# Patient Record
Sex: FEMALE | Race: OTHER | HISPANIC OR LATINO | Employment: UNEMPLOYED | URBAN - METROPOLITAN AREA
[De-identification: names, ages, dates, MRNs, and addresses within clinical notes are randomized per-mention and may not be internally consistent; named-entity substitution may affect disease eponyms.]

---

## 2019-01-16 ENCOUNTER — HOSPITAL ENCOUNTER (EMERGENCY)
Facility: HOSPITAL | Age: 44
Discharge: HOME | End: 2019-01-16
Attending: EMERGENCY MEDICINE | Admitting: EMERGENCY MEDICINE
Payer: COMMERCIAL

## 2019-01-16 VITALS
OXYGEN SATURATION: 100 % | RESPIRATION RATE: 18 BRPM | TEMPERATURE: 100.1 F | WEIGHT: 135 LBS | DIASTOLIC BLOOD PRESSURE: 61 MMHG | HEART RATE: 115 BPM | SYSTOLIC BLOOD PRESSURE: 137 MMHG

## 2019-01-16 DIAGNOSIS — L50.9 URTICARIA: Primary | ICD-10-CM

## 2019-01-16 PROCEDURE — 99283 EMERGENCY DEPT VISIT LOW MDM: CPT

## 2019-01-16 RX ORDER — HYDROXYZINE HYDROCHLORIDE 25 MG/1
25 TABLET, FILM COATED ORAL EVERY 6 HOURS
Qty: 15 TABLET | Refills: 0 | Status: SHIPPED | OUTPATIENT
Start: 2019-01-16

## 2019-01-16 RX ORDER — PREDNISONE 10 MG/1
TABLET ORAL
Qty: 38 TABLET | Refills: 0 | Status: SHIPPED | OUTPATIENT
Start: 2019-01-16

## 2019-01-16 NOTE — DISCHARGE INSTRUCTIONS
Urticaria   LO QUE NECESITA SABER:   La urticaria también se conoce roxie ronchas  Las ronchas pueden cambiar el tamaño y la forma y aparecen en cualquier lugar de la piel  La urticaria puede ser leve o severa y durar de unos minutos a unos días  Es probable que las ronchas cely un signo de alma reacción alérgica grave conocida roxie anafilaxis que necesita tratamiento inmediato  La urticaria que dura más de 6 semanas puede ser un trastorno crónico que necesita de tratamiento de larga duración  INSTRUCCIONES SOBRE EL CIRO HOSPITALARIA:   Llame al 911 en cesario de presentar signos o síntomas de anafilaxia,  roxie dificultad para respirar, inflamación en frankel boca o garganta, o sibilancias  También es posible que tenga comezón, sarpullido o sienta que se va a desmayar  Regrese a la alycia de emergencias si:   · Frankel corazón está latiendo más rápido de lo normal     · Usted tiene calambres o dolor demarcus en el abdomen  Pregúntele a frankel Tamiko Pel vitaminas y minerales son adecuados para usted  · Usted tiene fiebre  · Frankel piel todavía tiene comezón 24 horas después de tomarse frankel medicamento  · Usted todavía tiene ronchas 7 días después  · Aniket articulaciones están adoloridas e inflamadas  · Usted tiene preguntas o inquietudes acerca de frankel condición o cuidado  Medicamentos:   · La epinefrina  se [place] para tratar reacciones alérgicas graves roxie la anafilaxia  · Los antihistamínicos  reducen los síntomas moderados roxie la comezón o un sarpullido  · Esteroides  reducen el enrojecimiento, el dolor y la inflamación  · [place] aniket medicamentos roxie se le haya indicado  Consulte con frankel médico si usted harry que frankel medicamento no le está ayudando o si presenta efectos secundarios  Infórmele si es alérgico a algún medicamento  Mantenga alma lista actualizada de los [place], las vitaminas y los productos herbales que shantel   Incluya los siguientes datos de los medicamentos: cantidad, frecuencia y [place] de administración  Traiga con usted la lista o los envases de la píldoras a aniket citas de seguimiento  Lleve la lista de los medicamentos con usted en cesario de alma emergencia  [place] que necesito seguir para los signos o síntomas de la anafilaxia:   · Inmediatamente  aplíquese 1 inyección de epinefrina abad hágalo solamente en el músculo del muslo que da hacia afuera  · Deje la inyección en el lugar  según las indicaciones  Es probable que frankel médico le recomiende que se la deje puesta por hasta 10 segundos antes de quitarla  [place] ayuda a asegurarse de que toda la epinefrina sea aplicada  · Llame al 911 y vaya al servicio de [place],  aunque la inyección haya edgar aniket síntomas  No maneje usted mismo  Lleve con usted la inyección de epinefrina que usó  Precauciones de seguridad a madeline si usted corre riesgo de anafilaxia:   · Tenga a mano 2 inyecciones de epinefrina en todo momento  Puede que usted necesite alma segunda inyección ya que la epinefrina solamente actúa por aproximadamente 20 minutos y los síntomas podrían regresar  Frankel médico puede mostrarle a usted y a aniket familiares cómo aplicar la inyección  Revise la fecha de vencimiento todos los meses y reemplace el medicamento de frankel vencimiento  · [place] un plan de acción  Frankel médico puede ayudarle a crear un plan escrito que explique la alergia y un plan de emergencia para tratar alma reacción  El plan explica cuándo aplicar alma segunda inyección de epinefrina si los síntomas vuelven o no mejoran después de la primera inyección  Asegúrese de que aniket familiares, personal de frankel trabajo y escuela tengan alma copia del plan de acción e instrucciones de emergencia  Muéstreles cómo aplicar la inyección de epinefrina  · Tenga cuidado cuando ady ejercicio  Si usted tuvo anafilaxis inducida por el ejercicio, no se ejercite inmediatamente después de comer  Pare de ejercitarse de inmediato si empieza a desarrollar cualquier signo o síntoma de anafilaxia  Puede que al principio se sienta cansado, caliente o tenga comezón en la piel  También podría desarrollar urticaria, inflamación y problemas graves de respiración si continúa ejercitándose  · Lleve alma identificación de [employer]  Use un brazalete o collar de alerta médica o lleve alma tarjeta que explique la alergia  Pregúntele a frankel médico dónde conseguir estos artículos  · Mantenga un diario de los desencadenantes y síntomas  Anote todo lo que usted come, shantel o aplique en frankel piel por 3 semanas  Incluya eventos estresantes y lo que usted estaba haciendo carlos antes de que empezara la urticaria  [place] frankel registro a las citas de seguimiento con frankel médico   Controle la urticaria:   · Enfríe frankel piel  Puede que esto le ayude a disminuir la comezón  Aplíquese alma compresa fría sobre la urticaria  Sumerja alma toalla en agua fría, escúrrala y póngasela sobre la urticaria  También puede empapar frankel piel en un baño de agua fría con therese  · No se rasque las ronchas  [place] puede irritar frankel piel y causarle más ronchas  · Use ropa holgada  La ropa ajustada podría irritar frankel piel y causarle más ronchas  · Controle el estrés  El estrés podría provocar la urticaria o incluso empeorarla  Aprenda nuevas maneras de relajarse roxie la respiración profunda  Acuda a aniket consultas de control con frankel médico según le indicaron  Anote aniket preguntas para que se acuerde de hacerlas bonnie aniket visitas  © 2017 [address] Information is for End User's use only and may not be sold, redistributed or otherwise used for commercial purposes  All illustrations and images included in CareNotes® are the copyrighted property of A D A M , Inc  or Raúl Makenna  Esta información es sólo para uso en educación  Frankel intención no es darle un consejo médico sobre enfermedades o tratamientos   Colsulte con frankel Kandace Antis farmacéutico antes de seguir cualquier régimen médico para saber si es seguro y efectivo para usted

## 2019-01-16 NOTE — ED PROVIDER NOTES
History  Chief Complaint   Patient presents with    Allergic Reaction     patietn c/o of rash and SOB that started today;      38-year-old female presents for evaluation a rash with also the complaint of shortness of breath which began today after she is not body wash  She complains of rash to the forearms and the lower legs  Patient is no longer complaining shortness of breath on my evaluation  She states that she took some Benadryl without relief of the itching            None       Past Medical History:   Diagnosis Date    Asthma        History reviewed  No pertinent surgical history  History reviewed  No pertinent family history  I have reviewed and agree with the history as documented  Social History   Substance Use Topics    Smoking status: Current Every Day Smoker    Smokeless tobacco: Never Used    Alcohol use Yes      Comment: socially        Review of Systems   Constitutional: Positive for chills  Negative for fever  HENT: Positive for sore throat  Respiratory: Negative for shortness of breath, wheezing and stridor  Skin: Positive for rash  All other systems reviewed and are negative  Physical Exam  Physical Exam   Constitutional: She is oriented to person, place, and time  She appears well-developed and well-nourished  No distress  HENT:   Head: Normocephalic and atraumatic  Right Ear: External ear normal    Left Ear: External ear normal    Eyes: Pupils are equal, round, and reactive to light  Conjunctivae and EOM are normal  No scleral icterus  Neck: Normal range of motion  Cardiovascular: Normal rate, regular rhythm and normal heart sounds  Pulmonary/Chest: Effort normal and breath sounds normal  No respiratory distress  She has no wheezes  Abdominal: Soft  Bowel sounds are normal  There is no tenderness  There is no rebound and no guarding  Musculoskeletal: Normal range of motion  She exhibits no edema     Neurological: She is alert and oriented to person, place, and time  Skin: Skin is warm and dry  Rash noted  Rash is urticarial ( forearms, legs, abdomen)  Psychiatric: She has a normal mood and affect  Nursing note and vitals reviewed  Vital Signs  ED Triage Vitals [01/16/19 1549]   Temperature Pulse Respirations Blood Pressure SpO2   100 1 °F (37 8 °C) (!) 115 18 137/61 100 %      Temp Source Heart Rate Source Patient Position - Orthostatic VS BP Location FiO2 (%)   Oral Monitor Sitting Right arm --      Pain Score       7           Vitals:    01/16/19 1549   BP: 137/61   Pulse: (!) 115   Patient Position - Orthostatic VS: Sitting       Visual Acuity      ED Medications  Medications - No data to display    Diagnostic Studies  Results Reviewed     None                 No orders to display              Procedures  Procedures       Phone Contacts  ED Phone Contact    ED Course                               MDM  Number of Diagnoses or Management Options  Urticaria: new and requires workup  Diagnosis management comments: The patient does not have signs of anaphylaxis on my evaluation  There is no respiratory involvement as I do not note stridor nor wheezing  I discussed the plan for Atarax and a prednisone taper  The patient (and any family present) verbalized understanding of the discharge instructions and warnings that would necessitate return to the Emergency Department  All questions were answered prior to discharge  CritCare Time    Disposition  Final diagnoses:   Urticaria     Time reflects when diagnosis was documented in both MDM as applicable and the Disposition within this note     Time User Action Codes Description Comment    1/16/2019  4:06 PM Alysia URBINA Add [L50 9] Urticaria       ED Disposition     ED Disposition Condition Comment    Discharge  Harpreet Spaulding discharge to home/self care      Condition at discharge: Good        Follow-up Information     Follow up With Specialties Details Why Contact Info Additional Information [facility] Schedule an appointment as soon as possible for a visit For further evaluation, if not improved [phone] [address] [facility] Emergency Department Emergency Medicine Go to For further evaluation, If symptoms worsen [address] [facility] ED, [address]  , [facility], [place], [address]          Discharge Medication List as of 1/16/2019  4:08 PM      START taking these medications    Details   hydrOXYzine HCL (ATARAX) 25 mg tablet Take 1 tablet (25 mg total) by mouth every 6 (six) hours, Starting Wed 1/16/2019, Print      predniSONE 10 mg tablet 6 tabs daily for 4 days then, 4 tabs daily for 2 days then, 2 tabs daily for 2 days then, 1 tab daily 2 days, Print           No discharge procedures on file      ED Provider  Electronically Signed by           Katie Zuluaga DO  01/16/19 3574

## 2019-01-16 NOTE — ED NOTES
Rash noted to left arm; patient states that she feels SOB but does not seem to be in any acute respiratory distress       Leelee Allen RN  01/16/19 8233